# Patient Record
Sex: FEMALE | Race: WHITE | Employment: UNEMPLOYED | URBAN - NONMETROPOLITAN AREA
[De-identification: names, ages, dates, MRNs, and addresses within clinical notes are randomized per-mention and may not be internally consistent; named-entity substitution may affect disease eponyms.]

---

## 2023-08-10 ENCOUNTER — HOSPITAL ENCOUNTER (OUTPATIENT)
Dept: NUCLEAR MEDICINE | Age: 53
Discharge: HOME | End: 2023-08-10

## 2023-08-10 DIAGNOSIS — R60.0 BILATERAL LOWER EXTREMITY EDEMA: ICD-10-CM

## 2023-08-10 DIAGNOSIS — I88.9 NONSPECIFIC LYMPHADENITIS: ICD-10-CM

## 2023-12-12 ENCOUNTER — HOSPITAL ENCOUNTER (OUTPATIENT)
Age: 53
Discharge: HOME | End: 2023-12-12
Payer: COMMERCIAL

## 2023-12-12 PROCEDURE — 36415 COLL VENOUS BLD VENIPUNCTURE: CPT

## 2023-12-12 PROCEDURE — 86038 ANTINUCLEAR ANTIBODIES: CPT

## 2023-12-14 LAB — NUCLEAR IGG SER QL IA: DETECTED

## 2023-12-16 LAB
ANA PAT SER IF-IMP: ABNORMAL
NUCLEAR IGG SER QL IF: DETECTED
NUCLEAR IGG TITR SER IF: ABNORMAL {TITER}

## 2024-09-24 ENCOUNTER — HOSPITAL ENCOUNTER (OUTPATIENT)
Dept: NUCLEAR MEDICINE | Age: 54
Discharge: HOME | End: 2024-09-24
Attending: INTERNAL MEDICINE
Payer: COMMERCIAL

## 2024-09-24 DIAGNOSIS — K31.84 GASTROPARESIS: ICD-10-CM

## 2024-09-24 PROCEDURE — A9541 TC99M SULFUR COLLOID: HCPCS | Performed by: INTERNAL MEDICINE

## 2024-09-24 PROCEDURE — 78264 GASTRIC EMPTYING IMG STUDY: CPT

## 2024-09-24 PROCEDURE — 3430000000 HC RX DIAGNOSTIC RADIOPHARMACEUTICAL: Performed by: INTERNAL MEDICINE

## 2024-09-24 RX ADMIN — Medication 1.1 MILLICURIE: at 07:25

## 2024-12-16 ENCOUNTER — LAB (OUTPATIENT)
Dept: LAB | Age: 54
End: 2024-12-16

## 2024-12-16 ENCOUNTER — OFFICE VISIT (OUTPATIENT)
Age: 54
End: 2024-12-16
Payer: COMMERCIAL

## 2024-12-16 VITALS
OXYGEN SATURATION: 98 % | HEIGHT: 61 IN | SYSTOLIC BLOOD PRESSURE: 92 MMHG | BODY MASS INDEX: 36.63 KG/M2 | WEIGHT: 194 LBS | HEART RATE: 78 BPM | DIASTOLIC BLOOD PRESSURE: 68 MMHG

## 2024-12-16 DIAGNOSIS — R76.0 ANTI-CARDIOLIPIN ANTIBODY POSITIVE: ICD-10-CM

## 2024-12-16 DIAGNOSIS — M25.50 POLYARTHRALGIA: ICD-10-CM

## 2024-12-16 DIAGNOSIS — M54.42 CHRONIC MIDLINE LOW BACK PAIN WITH BILATERAL SCIATICA: ICD-10-CM

## 2024-12-16 DIAGNOSIS — R53.83 OTHER FATIGUE: ICD-10-CM

## 2024-12-16 DIAGNOSIS — R76.8 ANA POSITIVE: ICD-10-CM

## 2024-12-16 DIAGNOSIS — M54.41 CHRONIC MIDLINE LOW BACK PAIN WITH BILATERAL SCIATICA: ICD-10-CM

## 2024-12-16 DIAGNOSIS — R76.8 ANA POSITIVE: Primary | ICD-10-CM

## 2024-12-16 DIAGNOSIS — G89.29 CHRONIC MIDLINE LOW BACK PAIN WITH BILATERAL SCIATICA: ICD-10-CM

## 2024-12-16 LAB
ALBUMIN SERPL BCG-MCNC: 4.3 G/DL (ref 3.5–5.1)
ALP SERPL-CCNC: 111 U/L (ref 38–126)
ALT SERPL W/O P-5'-P-CCNC: 10 U/L (ref 11–66)
ANION GAP SERPL CALC-SCNC: 12 MEQ/L (ref 8–16)
AST SERPL-CCNC: 10 U/L (ref 5–40)
BASOPHILS ABSOLUTE: 0 THOU/MM3 (ref 0–0.1)
BASOPHILS NFR BLD AUTO: 0.2 %
BILIRUB SERPL-MCNC: 0.3 MG/DL (ref 0.3–1.2)
BILIRUB UR QL STRIP: NEGATIVE
BUN SERPL-MCNC: 14 MG/DL (ref 7–22)
CALCIUM SERPL-MCNC: 8.9 MG/DL (ref 8.5–10.5)
CHARACTER UR: CLEAR
CHLORIDE SERPL-SCNC: 105 MEQ/L (ref 98–111)
CO2 SERPL-SCNC: 23 MEQ/L (ref 23–33)
COLOR UR: ABNORMAL
CREAT SERPL-MCNC: 0.7 MG/DL (ref 0.4–1.2)
CREAT UR-MCNC: 226.7 MG/DL
CRP SERPL-MCNC: < 0.3 MG/DL (ref 0–1)
DEPRECATED RDW RBC AUTO: 42.9 FL (ref 35–45)
EOSINOPHIL NFR BLD AUTO: 0.5 %
EOSINOPHILS ABSOLUTE: 0 THOU/MM3 (ref 0–0.4)
ERYTHROCYTE [DISTWIDTH] IN BLOOD BY AUTOMATED COUNT: 13 % (ref 11.5–14.5)
ERYTHROCYTE [SEDIMENTATION RATE] IN BLOOD BY WESTERGREN METHOD: 7 MM/HR (ref 0–20)
GFR SERPL CREATININE-BSD FRML MDRD: > 90 ML/MIN/1.73M2
GLUCOSE SERPL-MCNC: 90 MG/DL (ref 70–108)
GLUCOSE UR QL STRIP.AUTO: NEGATIVE MG/DL
HCT VFR BLD AUTO: 42.9 % (ref 37–47)
HGB BLD-MCNC: 14 GM/DL (ref 12–16)
HGB UR QL STRIP.AUTO: NEGATIVE
IMM GRANULOCYTES # BLD AUTO: 0.03 THOU/MM3 (ref 0–0.07)
IMM GRANULOCYTES NFR BLD AUTO: 0.4 %
KETONES UR QL STRIP.AUTO: ABNORMAL
LEUKOCYTE ESTERASE UR QL STRIP.AUTO: NEGATIVE
LYMPHOCYTES ABSOLUTE: 2.1 THOU/MM3 (ref 1–4.8)
LYMPHOCYTES NFR BLD AUTO: 26.6 %
MCH RBC QN AUTO: 29.3 PG (ref 26–33)
MCHC RBC AUTO-ENTMCNC: 32.6 GM/DL (ref 32.2–35.5)
MCV RBC AUTO: 89.7 FL (ref 81–99)
MONOCYTES ABSOLUTE: 0.5 THOU/MM3 (ref 0.4–1.3)
MONOCYTES NFR BLD AUTO: 6 %
NEUTROPHILS ABSOLUTE: 5.3 THOU/MM3 (ref 1.8–7.7)
NEUTROPHILS NFR BLD AUTO: 66.3 %
NITRITE UR QL STRIP.AUTO: NEGATIVE
NRBC BLD AUTO-RTO: 0 /100 WBC
PH UR STRIP.AUTO: 5.5 [PH] (ref 5–9)
PLATELET # BLD AUTO: 223 THOU/MM3 (ref 130–400)
PMV BLD AUTO: 11.1 FL (ref 9.4–12.4)
POTASSIUM SERPL-SCNC: 3.6 MEQ/L (ref 3.5–5.2)
PROT SERPL-MCNC: 6.9 G/DL (ref 6.1–8)
PROT UR STRIP.AUTO-MCNC: NEGATIVE MG/DL
PROT UR-MCNC: 25.5 MG/DL
PROT/CREAT 24H UR: 0.11 MG/G{CREAT}
RBC # BLD AUTO: 4.78 MILL/MM3 (ref 4.2–5.4)
SODIUM SERPL-SCNC: 140 MEQ/L (ref 135–145)
SP GR UR REFRACT.AUTO: > 1.03 (ref 1–1.03)
UROBILINOGEN UR QL STRIP.AUTO: 0.2 EU/DL (ref 0–1)
WBC # BLD AUTO: 8 THOU/MM3 (ref 4.8–10.8)

## 2024-12-16 PROCEDURE — 3017F COLORECTAL CA SCREEN DOC REV: CPT | Performed by: INTERNAL MEDICINE

## 2024-12-16 PROCEDURE — 4004F PT TOBACCO SCREEN RCVD TLK: CPT | Performed by: INTERNAL MEDICINE

## 2024-12-16 PROCEDURE — G8417 CALC BMI ABV UP PARAM F/U: HCPCS | Performed by: INTERNAL MEDICINE

## 2024-12-16 PROCEDURE — G8427 DOCREV CUR MEDS BY ELIG CLIN: HCPCS | Performed by: INTERNAL MEDICINE

## 2024-12-16 PROCEDURE — 99204 OFFICE O/P NEW MOD 45 MIN: CPT | Performed by: INTERNAL MEDICINE

## 2024-12-16 PROCEDURE — G8484 FLU IMMUNIZE NO ADMIN: HCPCS | Performed by: INTERNAL MEDICINE

## 2024-12-16 RX ORDER — IBUPROFEN 800 MG/1
800 TABLET, FILM COATED ORAL EVERY 8 HOURS PRN
COMMUNITY
Start: 2024-10-17

## 2024-12-16 RX ORDER — ATORVASTATIN CALCIUM 20 MG/1
20 TABLET, FILM COATED ORAL DAILY
COMMUNITY
Start: 2024-04-06

## 2024-12-16 RX ORDER — METFORMIN HYDROCHLORIDE 500 MG/1
500 TABLET, EXTENDED RELEASE ORAL 2 TIMES DAILY
COMMUNITY
Start: 2024-02-05

## 2024-12-16 RX ORDER — POTASSIUM CHLORIDE 750 MG/1
10 TABLET, EXTENDED RELEASE ORAL DAILY
COMMUNITY
Start: 2024-11-04 | End: 2025-05-03

## 2024-12-16 RX ORDER — ALBUTEROL SULFATE 90 UG/1
2 INHALANT RESPIRATORY (INHALATION) EVERY 6 HOURS PRN
COMMUNITY
Start: 2024-01-10

## 2024-12-16 RX ORDER — LEVOTHYROXINE SODIUM 125 UG/1
125 TABLET ORAL
COMMUNITY
Start: 2024-11-05

## 2024-12-16 RX ORDER — BUMETANIDE 1 MG/1
TABLET ORAL
COMMUNITY
Start: 2024-05-05

## 2024-12-16 RX ORDER — CYCLOBENZAPRINE HCL 10 MG
10 TABLET ORAL 3 TIMES DAILY PRN
COMMUNITY
Start: 2024-04-24

## 2024-12-16 RX ORDER — TRAZODONE HYDROCHLORIDE 100 MG/1
200 TABLET ORAL NIGHTLY
COMMUNITY
Start: 2024-03-17

## 2024-12-16 RX ORDER — SEMAGLUTIDE 0.68 MG/ML
0.5 INJECTION, SOLUTION SUBCUTANEOUS WEEKLY
COMMUNITY
Start: 2024-04-10

## 2024-12-16 RX ORDER — NYSTATIN 100000 U/G
CREAM TOPICAL
COMMUNITY
Start: 2024-11-04

## 2024-12-16 RX ORDER — HYDROXYZINE HYDROCHLORIDE 25 MG/1
25 TABLET, FILM COATED ORAL 3 TIMES DAILY PRN
COMMUNITY
Start: 2024-04-07

## 2024-12-16 RX ORDER — ONDANSETRON 4 MG/1
4 TABLET, ORALLY DISINTEGRATING ORAL EVERY 8 HOURS PRN
COMMUNITY
Start: 2024-11-04

## 2024-12-16 RX ORDER — FLUOXETINE 40 MG/1
CAPSULE ORAL
COMMUNITY
Start: 2024-04-04

## 2024-12-16 RX ORDER — LINACLOTIDE 290 UG/1
CAPSULE, GELATIN COATED ORAL
COMMUNITY
Start: 2024-10-11

## 2024-12-16 ASSESSMENT — ENCOUNTER SYMPTOMS
EYES NEGATIVE: 1
GASTROINTESTINAL NEGATIVE: 1
RESPIRATORY NEGATIVE: 1

## 2024-12-16 NOTE — PROGRESS NOTES
12/16/2024       The risks and benefits of my recommendations, as well as other treatment options, benefits and side effects were discussed with the patient today.Questions were answered.    Thank you for allowing me to participate in the care of this patient. Please call if there are any questions.

## 2024-12-17 LAB
C3C SERPL-MCNC: 146 MG/DL (ref 90–180)
C4 SERPL-MCNC: 27 MG/DL (ref 10–40)

## 2024-12-18 ENCOUNTER — TELEPHONE (OUTPATIENT)
Age: 54
End: 2024-12-18

## 2024-12-18 NOTE — TELEPHONE ENCOUNTER
Attempted to contact the pt, no answer; left message for the pt to contact the office to discuss.

## 2024-12-18 NOTE — TELEPHONE ENCOUNTER
----- Message from Dr. Liz Salgado, DO sent at 12/18/2024 12:17 PM EST -----  The labs have revealed no significant abnormalities in the blood counts, liver kidney function test.  The urine studies revealed no significant elevation of protein in the urine or evidence of infection.    There are lab(s) that have not returned. Once these have returned you will be notified of any abnormalities

## 2024-12-23 LAB
CONFIRM DRVVT STA-STACLOT: 5 S
DRVVT SCREEN TO CONFIRM RATIO: ABNORMAL {RATIO}
DRVVT/DRVVT CFM P DOAC NEUT NORM PPP-RTO: ABNORMAL {RATIO}
HEPARIN NT PPP QL: ABNORMAL
LA 3 SCREEN W REFLEX-IMP: ABNORMAL
LMW HEPARIN IND PLT AB SER QL: ABNORMAL
MIXING DRVVT/NORMAL: ABNORMAL %
PROTHROMBIN TIME: 12.5 S (ref 12–15.5)
SCREEN APTT/NORMAL: 1.26
SCREEN APTT/NORMAL: ABNORMAL
SCREEN DRVVT/NORMAL: 1.01 %
THROMBIN TIME: 16 S

## 2024-12-24 ENCOUNTER — TELEPHONE (OUTPATIENT)
Age: 54
End: 2024-12-24

## 2024-12-24 DIAGNOSIS — M79.7 FIBROMYALGIA: Primary | ICD-10-CM

## 2024-12-24 NOTE — TELEPHONE ENCOUNTER
----- Message from Dr. Liz Salgado DO sent at 12/23/2024  8:46 AM EST -----  The send out labs showed a positive antinuclear antibody (MARJ) and low beta 2 glycoprotein level. The blood testing help specifically look for systemic lupus and other similar inflammatory conditions.

## 2024-12-26 ENCOUNTER — HOSPITAL ENCOUNTER (OUTPATIENT)
Dept: GENERAL RADIOLOGY | Age: 54
Discharge: HOME | End: 2024-12-26

## 2024-12-26 ENCOUNTER — HOSPITAL ENCOUNTER (OUTPATIENT)
Dept: CT IMAGING | Age: 54
Discharge: HOME | End: 2024-12-26
Attending: RADIOLOGY

## 2024-12-26 ENCOUNTER — TELEPHONE (OUTPATIENT)
Age: 54
End: 2024-12-26

## 2024-12-26 DIAGNOSIS — Z00.6 EXAMINATION FOR NORMAL COMPARISON OR CONTROL IN CLINICAL RESEARCH: ICD-10-CM

## 2024-12-26 DIAGNOSIS — M25.50 POLYARTHRALGIA: ICD-10-CM

## 2024-12-26 DIAGNOSIS — M79.7 FIBROMYALGIA: Primary | ICD-10-CM

## 2024-12-26 RX ORDER — GABAPENTIN 300 MG/1
300 CAPSULE ORAL NIGHTLY
Qty: 90 CAPSULE | Refills: 1 | Status: SHIPPED | OUTPATIENT
Start: 2024-12-26 | End: 2024-12-26 | Stop reason: SDUPTHER

## 2024-12-26 RX ORDER — GABAPENTIN 300 MG/1
CAPSULE ORAL
Qty: 67 CAPSULE | Refills: 0 | Status: SHIPPED | OUTPATIENT
Start: 2024-12-26 | End: 2025-02-01

## 2024-12-26 NOTE — TELEPHONE ENCOUNTER
Diagnosis Orders   1. Fibromyalgia  gabapentin (NEURONTIN) 300 MG capsule    DISCONTINUED: gabapentin (NEURONTIN) 300 MG capsule

## 2024-12-27 RX ORDER — PREDNISONE 10 MG/1
TABLET ORAL
Qty: 15 TABLET | Refills: 0 | Status: SHIPPED | OUTPATIENT
Start: 2024-12-27 | End: 2025-01-05

## 2024-12-27 NOTE — TELEPHONE ENCOUNTER
Back imaging reviewed. There are no overt SI joint changes. Lower thoracic spine with bilateral thing bridging osteophytes.

## 2025-03-05 ENCOUNTER — OFFICE VISIT (OUTPATIENT)
Age: 55
End: 2025-03-05
Payer: MEDICAID

## 2025-03-05 VITALS
HEIGHT: 61 IN | OXYGEN SATURATION: 96 % | DIASTOLIC BLOOD PRESSURE: 78 MMHG | HEART RATE: 83 BPM | SYSTOLIC BLOOD PRESSURE: 122 MMHG | BODY MASS INDEX: 37.78 KG/M2 | WEIGHT: 200.1 LBS

## 2025-03-05 DIAGNOSIS — R76.0 ANTI-CARDIOLIPIN ANTIBODY POSITIVE: ICD-10-CM

## 2025-03-05 DIAGNOSIS — M54.42 CHRONIC MIDLINE LOW BACK PAIN WITH BILATERAL SCIATICA: ICD-10-CM

## 2025-03-05 DIAGNOSIS — Z51.81 MEDICATION MONITORING ENCOUNTER: ICD-10-CM

## 2025-03-05 DIAGNOSIS — G89.29 CHRONIC MIDLINE LOW BACK PAIN WITH BILATERAL SCIATICA: ICD-10-CM

## 2025-03-05 DIAGNOSIS — M54.41 CHRONIC MIDLINE LOW BACK PAIN WITH BILATERAL SCIATICA: ICD-10-CM

## 2025-03-05 DIAGNOSIS — R76.8 ANA POSITIVE: ICD-10-CM

## 2025-03-05 DIAGNOSIS — M79.7 FIBROMYALGIA: ICD-10-CM

## 2025-03-05 DIAGNOSIS — M25.50 POLYARTHRALGIA: Primary | ICD-10-CM

## 2025-03-05 PROCEDURE — 99214 OFFICE O/P EST MOD 30 MIN: CPT | Performed by: NURSE PRACTITIONER

## 2025-03-05 PROCEDURE — 99213 OFFICE O/P EST LOW 20 MIN: CPT | Performed by: NURSE PRACTITIONER

## 2025-03-05 RX ORDER — SULFASALAZINE 500 MG/1
TABLET ORAL
Qty: 120 TABLET | Refills: 0 | Status: SHIPPED | OUTPATIENT
Start: 2025-03-05

## 2025-03-05 ASSESSMENT — ENCOUNTER SYMPTOMS
EYES NEGATIVE: 1
RESPIRATORY NEGATIVE: 1
GASTROINTESTINAL NEGATIVE: 1

## 2025-03-05 NOTE — PROGRESS NOTES
6 HOURS PRN    atorvastatin (LIPITOR) 20 mg, Oral, DAILY    bumetanide (BUMEX) 1 MG tablet 2 in the AM and 1 at noon    cyclobenzaprine (FLEXERIL) 10 mg, Oral, 3 TIMES DAILY PRN    esomeprazole (NEXIUM) 20 mg, Oral, DAILY BEFORE BREAKFAST    FLUoxetine (PROZAC) 40 MG capsule     gabapentin (NEURONTIN) 300 MG capsule Take 1 capsule by mouth nightly for 7 days, THEN 1 capsule in the morning and at bedtime for 30 days. Intended supply: 90 days.    hydrOXYzine HCl (ATARAX) 25 mg, Oral, 3 TIMES DAILY PRN    ibuprofen (ADVIL;MOTRIN) 800 mg, Oral, EVERY 8 HOURS PRN    levothyroxine (SYNTHROID) 125 mcg, Oral, DAILY BEFORE BREAKFAST    LINZESS 290 MCG CAPS capsule     metFORMIN (GLUCOPHAGE-XR) 500 mg, Oral, 2 TIMES DAILY    nystatin (MYCOSTATIN) 609441 UNIT/GM cream Apply 2 times a day to affected area(s) until area resolved.    ondansetron (ZOFRAN-ODT) 4 mg, Oral, EVERY 8 HOURS PRN    Ozempic (0.25 or 0.5 MG/DOSE) 0.5 mg, SubCUTAneous, WEEKLY    potassium chloride (KLOR-CON M) 10 MEQ extended release tablet 10 mEq, Oral, DAILY    traZODone (DESYREL) 200 mg, Oral, NIGHTLY       Allergies:  Morphine    Objective   /78 (Site: Left Upper Arm, Position: Sitting, Cuff Size: Large Adult)   Pulse 83   Ht 1.549 m (5' 0.98\")   Wt 90.8 kg (200 lb 1.6 oz)   SpO2 96%   BMI 37.83 kg/m²     Physical Exam  Vitals reviewed.   Constitutional:       Appearance: She is well-developed.   Cardiovascular:      Rate and Rhythm: Normal rate and regular rhythm.   Pulmonary:      Effort: Pulmonary effort is normal.      Breath sounds: Normal breath sounds.   Musculoskeletal:      Cervical back: Normal range of motion and neck supple.   Skin:     General: Skin is warm and dry.      Findings: No rash.   Neurological:      Mental Status: She is alert and oriented to person, place, and time.   Psychiatric:         Thought Content: Thought content normal.       Musculoskeletal:    Upper extremities:  SHOULDERS tender right   ELBOWS tender right

## 2025-03-11 ENCOUNTER — TELEPHONE (OUTPATIENT)
Dept: ADMINISTRATIVE | Age: 55
End: 2025-03-11

## 2025-03-11 NOTE — TELEPHONE ENCOUNTER
Where is pain located?  Left wrist, left hand, lower back   When did the pain start?  She states that the pain worsened over the weekend.  Rate the pain 1-10. Ten being the worst  9  Describe the pain.  (Dull, Aching, Stabbing, radiating, etc)  Achy, tender to touch  Has this pain occurred in the past?  Yes   What have you used to alleviate this pain?  Tylenol arthritis, biofreeze   What aggravates it?  Any movement   Joint swelling or redness?  Swelling.

## 2025-03-11 NOTE — TELEPHONE ENCOUNTER
Can you please clarify where the joint swelling is located at? Also, would like for her to have bloodwork completed to see if inflammatory markers are increased with the flare.

## 2025-03-11 NOTE — TELEPHONE ENCOUNTER
Patient requesting prednisone she states she is in a lot of pain.     Please send to   [facility] [address] [place] [address], [place] - [address] [facility] -  [phone] - F [phone]

## 2025-03-12 NOTE — TELEPHONE ENCOUNTER
Contacted patient.  She will go today to get the labs completed.  She states that the swelling is the on the top of her hand and extends back to the base of the thumb near the wrist.

## 2025-03-15 LAB
ALBUMIN: 4.2 G/DL
ALP BLD-CCNC: 86 U/L
ALT SERPL-CCNC: 21 U/L
ANION GAP SERPL CALCULATED.3IONS-SCNC: 13 MMOL/L
AST SERPL-CCNC: 27 U/L
BASOPHILS ABSOLUTE: 0 /ΜL
BASOPHILS RELATIVE PERCENT: 0.4 %
BILIRUB SERPL-MCNC: 0.5 MG/DL (ref 0.1–1.4)
BUN BLDV-MCNC: 15 MG/DL
C-REACTIVE PROTEIN: 0.5
CALCIUM SERPL-MCNC: 9 MG/DL
CHLORIDE BLD-SCNC: 107 MMOL/L
CO2: 24 MMOL/L
CREAT SERPL-MCNC: 0.8 MG/DL
EOSINOPHILS ABSOLUTE: 0.4 /ΜL
EOSINOPHILS RELATIVE PERCENT: 0.6 %
GFR, ESTIMATED: 60
GLUCOSE BLD-MCNC: 103 MG/DL
HCT VFR BLD CALC: 43.5 % (ref 36–46)
HEMOGLOBIN: 14.1 G/DL (ref 12–16)
LYMPHOCYTES ABSOLUTE: 2.8 /ΜL
LYMPHOCYTES RELATIVE PERCENT: 34.1 %
MCH RBC QN AUTO: 29.3 PG
MCHC RBC AUTO-ENTMCNC: 32.4 G/DL
MCV RBC AUTO: 90.4 FL
MONOCYTES ABSOLUTE: 0.5 /ΜL
MONOCYTES RELATIVE PERCENT: 6.4 %
NEUTROPHILS ABSOLUTE: 4.7 /ΜL
NEUTROPHILS RELATIVE PERCENT: 58.1 %
PDW BLD-RTO: 13.2 %
PLATELET # BLD: 217 K/ΜL
PMV BLD AUTO: 10.1 FL
POTASSIUM SERPL-SCNC: 3.9 MMOL/L
RBC # BLD: 4.81 10^6/ΜL
SED RATE, AUTOMATED: 5
SODIUM BLD-SCNC: 140 MMOL/L
TOTAL PROTEIN: 7.1 G/DL (ref 6.4–8.2)
WBC # BLD: 8.2 10^3/ML

## 2025-03-17 ENCOUNTER — RESULTS FOLLOW-UP (OUTPATIENT)
Age: 55
End: 2025-03-17

## 2025-03-17 RX ORDER — SULFASALAZINE 500 MG/1
1000 TABLET ORAL 2 TIMES DAILY
Qty: 120 TABLET | Refills: 0 | Status: SHIPPED | OUTPATIENT
Start: 2025-03-17

## 2025-05-06 ENCOUNTER — OFFICE VISIT (OUTPATIENT)
Age: 55
End: 2025-05-06
Payer: COMMERCIAL

## 2025-05-06 VITALS
BODY MASS INDEX: 37.69 KG/M2 | HEART RATE: 71 BPM | OXYGEN SATURATION: 97 % | DIASTOLIC BLOOD PRESSURE: 68 MMHG | SYSTOLIC BLOOD PRESSURE: 126 MMHG | HEIGHT: 61 IN | WEIGHT: 199.6 LBS

## 2025-05-06 DIAGNOSIS — R53.83 OTHER FATIGUE: ICD-10-CM

## 2025-05-06 DIAGNOSIS — G89.29 CHRONIC MIDLINE LOW BACK PAIN WITH BILATERAL SCIATICA: ICD-10-CM

## 2025-05-06 DIAGNOSIS — M79.7 FIBROMYALGIA: ICD-10-CM

## 2025-05-06 DIAGNOSIS — Z51.81 MEDICATION MONITORING ENCOUNTER: ICD-10-CM

## 2025-05-06 DIAGNOSIS — R21 RASH: ICD-10-CM

## 2025-05-06 DIAGNOSIS — R76.0 ANTI-CARDIOLIPIN ANTIBODY POSITIVE: ICD-10-CM

## 2025-05-06 DIAGNOSIS — M54.42 CHRONIC MIDLINE LOW BACK PAIN WITH BILATERAL SCIATICA: ICD-10-CM

## 2025-05-06 DIAGNOSIS — M54.41 CHRONIC MIDLINE LOW BACK PAIN WITH BILATERAL SCIATICA: ICD-10-CM

## 2025-05-06 DIAGNOSIS — M25.50 POLYARTHRALGIA: Primary | ICD-10-CM

## 2025-05-06 DIAGNOSIS — R76.8 ANA POSITIVE: ICD-10-CM

## 2025-05-06 PROCEDURE — 3017F COLORECTAL CA SCREEN DOC REV: CPT | Performed by: NURSE PRACTITIONER

## 2025-05-06 PROCEDURE — 4004F PT TOBACCO SCREEN RCVD TLK: CPT | Performed by: NURSE PRACTITIONER

## 2025-05-06 PROCEDURE — 99214 OFFICE O/P EST MOD 30 MIN: CPT | Performed by: NURSE PRACTITIONER

## 2025-05-06 PROCEDURE — G8417 CALC BMI ABV UP PARAM F/U: HCPCS | Performed by: NURSE PRACTITIONER

## 2025-05-06 PROCEDURE — G8427 DOCREV CUR MEDS BY ELIG CLIN: HCPCS | Performed by: NURSE PRACTITIONER

## 2025-05-06 RX ORDER — TRAMADOL HYDROCHLORIDE 50 MG/1
TABLET ORAL
COMMUNITY
Start: 2025-02-19

## 2025-05-06 ASSESSMENT — ENCOUNTER SYMPTOMS
GASTROINTESTINAL NEGATIVE: 1
RESPIRATORY NEGATIVE: 1
EYES NEGATIVE: 1

## 2025-05-06 NOTE — PROGRESS NOTES
[facility] RHEUMATOLOGY FOLLOW UP NOTE     Date Of Service: 5/6/2025  Provider: Mary Monson, APRN - CNP   PCP: Bimal Gee NP-C   Name: Luis M Gibson   MRN: [id number]    Subjective   CHIEF COMPLAINT:    Chief Complaint   Patient presents with    Follow-up     2-month f/u for polyarthralgia.        Luis M Gibson  is a(n)54 y.o. female  here for the f/u evaluation of + MARJ, polyarthralgia      Interval hx:    - continued joint pain-  ? Improvement on the sulfasalazine    - rash below breast and in groin- PCP gave her antifungal medication which is not helping     pain affecting the left wrist, low back   Pain on a scale 0-10: 5/10  Type of pain: intermittent  Timing: mornings and evenings  Aggravating factors:  back: prolonged sitting, walking. Hands: typing, writing   Alleviating factors: ibuprofen      Associated symptoms:  + swelling/  Redness/ warmth (left thumb), + AM stiffness lasting ~ 45 mins      REVIEW OF SYSTEMS: (ROS)    Review of Systems   Constitutional: Negative.    HENT: Negative.     Eyes: Negative.    Respiratory: Negative.     Cardiovascular: Negative.    Gastrointestinal: Negative.    Endocrine: Negative.    Genitourinary: Negative.    Musculoskeletal:  Positive for arthralgias and joint swelling.   Skin: Negative.    Neurological:  Positive for numbness.   Psychiatric/Behavioral: Negative.         Past Medical History:  has a past medical history of Autoimmune disease, Depression, Diabetes ([facility]), Fibromyalgia, Lupus (systemic lupus erythematosus) ([facility]), and Rheumatoid arthritis ([facility]).    Current Medications:    Current Outpatient Medications   Medication Instructions    albuterol sulfate HFA (PROVENTIL;VENTOLIN;PROAIR) 108 (90 Base) MCG/ACT inhaler 2 puffs, EVERY 6 HOURS PRN    atorvastatin (LIPITOR) 20 mg, DAILY    bumetanide (BUMEX) 1 MG tablet 2 in the AM and 1 at noon    cyclobenzaprine (FLEXERIL) 10 mg, 3 TIMES DAILY PRN    esomeprazole (NEXIUM) 20 mg, DAILY BEFORE

## 2025-05-21 ENCOUNTER — RESULTS FOLLOW-UP (OUTPATIENT)
Age: 55
End: 2025-05-21

## 2025-05-21 LAB
ALBUMIN: 3.9 G/DL
ALP BLD-CCNC: 87 U/L
ALT SERPL-CCNC: 16 U/L
ANION GAP SERPL CALCULATED.3IONS-SCNC: 12 MMOL/L
AST SERPL-CCNC: 20 U/L
BASOPHILS ABSOLUTE: 0 /ΜL
BASOPHILS RELATIVE PERCENT: 0.3 %
BILIRUB SERPL-MCNC: 0.5 MG/DL (ref 0.1–1.4)
BUN BLDV-MCNC: 18 MG/DL
C-REACTIVE PROTEIN: 0.5
CALCIUM SERPL-MCNC: 9.1 MG/DL
CHLORIDE BLD-SCNC: 106 MMOL/L
CO2: 25 MMOL/L
CREAT SERPL-MCNC: 1 MG/DL
EOSINOPHILS ABSOLUTE: 0.2 /ΜL
EOSINOPHILS RELATIVE PERCENT: 1.9 %
GFR, ESTIMATED: 60
GLUCOSE BLD-MCNC: 93 MG/DL
HCT VFR BLD CALC: 42 % (ref 36–46)
HEMOGLOBIN: 13.8 G/DL (ref 12–16)
LYMPHOCYTES ABSOLUTE: 3.1 /ΜL
LYMPHOCYTES RELATIVE PERCENT: 30.9 %
MCH RBC QN AUTO: 29.9 PG
MCHC RBC AUTO-ENTMCNC: 32.9 G/DL
MCV RBC AUTO: 90.9 FL
MONOCYTES ABSOLUTE: 0.8 /ΜL
MONOCYTES RELATIVE PERCENT: 7.9 %
NEUTROPHILS ABSOLUTE: 5.8 /ΜL
NEUTROPHILS RELATIVE PERCENT: 58.8 %
PDW BLD-RTO: 13.5 %
PLATELET # BLD: 247 K/ΜL
PMV BLD AUTO: 10.5 FL
POTASSIUM SERPL-SCNC: 3.6 MMOL/L
RBC # BLD: 4.62 10^6/ΜL
SED RATE, AUTOMATED: 5
SODIUM BLD-SCNC: 139 MMOL/L
TOTAL PROTEIN: 6.6 G/DL (ref 6.4–8.2)
WBC # BLD: 9.9 10^3/ML

## 2025-05-21 RX ORDER — SULFASALAZINE 500 MG/1
1000 TABLET ORAL 2 TIMES DAILY
Qty: 120 TABLET | Refills: 0 | Status: SHIPPED | OUTPATIENT
Start: 2025-05-21 | End: 2025-06-13 | Stop reason: SDUPTHER

## 2025-05-27 ENCOUNTER — RESULTS FOLLOW-UP (OUTPATIENT)
Age: 55
End: 2025-05-27

## 2025-06-12 LAB
ALBUMIN: 4.2 G/DL
ALP BLD-CCNC: 100 U/L
ALT SERPL-CCNC: 23 U/L
ANION GAP SERPL CALCULATED.3IONS-SCNC: 10 MMOL/L
AST SERPL-CCNC: 25 U/L
BASOPHILS ABSOLUTE: 0.1 /ΜL
BASOPHILS RELATIVE PERCENT: 0.4 %
BILIRUB SERPL-MCNC: 0.3 MG/DL (ref 0.1–1.4)
BUN BLDV-MCNC: 10 MG/DL
C-REACTIVE PROTEIN: 1.8
CALCIUM SERPL-MCNC: 9.1 MG/DL
CHLORIDE BLD-SCNC: 107 MMOL/L
CO2: 26 MMOL/L
CREAT SERPL-MCNC: 0.9 MG/DL
EOSINOPHILS ABSOLUTE: 0.5 /ΜL
EOSINOPHILS RELATIVE PERCENT: 3.9 %
GFR, ESTIMATED: 60
GLUCOSE BLD-MCNC: 111 MG/DL
HCT VFR BLD CALC: 42.7 % (ref 36–46)
HEMOGLOBIN: 14.1 G/DL (ref 12–16)
LYMPHOCYTES ABSOLUTE: 3 /ΜL
LYMPHOCYTES RELATIVE PERCENT: 25.8 %
MCH RBC QN AUTO: 29.4 PG
MCHC RBC AUTO-ENTMCNC: 33 G/DL
MCV RBC AUTO: 89 FL
MONOCYTES ABSOLUTE: 0.6 /ΜL
MONOCYTES RELATIVE PERCENT: 5.5 %
NEUTROPHILS ABSOLUTE: 7.3 /ΜL
NEUTROPHILS RELATIVE PERCENT: 63.9 %
PDW BLD-RTO: 13.3 %
PLATELET # BLD: 262 K/ΜL
PMV BLD AUTO: 9.9 FL
POTASSIUM SERPL-SCNC: 4.1 MMOL/L
RBC # BLD: 4.8 10^6/ΜL
SED RATE, AUTOMATED: 23
SODIUM BLD-SCNC: 139 MMOL/L
TOTAL PROTEIN: 7.1 G/DL (ref 6.4–8.2)
WBC # BLD: 11.5 10^3/ML

## 2025-06-13 ENCOUNTER — RESULTS FOLLOW-UP (OUTPATIENT)
Age: 55
End: 2025-06-13

## 2025-06-13 RX ORDER — SULFASALAZINE 500 MG/1
1000 TABLET ORAL 2 TIMES DAILY
Qty: 120 TABLET | Refills: 0 | Status: SHIPPED | OUTPATIENT
Start: 2025-06-13

## 2025-07-21 ENCOUNTER — OFFICE VISIT (OUTPATIENT)
Age: 55
End: 2025-07-21
Payer: COMMERCIAL

## 2025-07-21 VITALS
WEIGHT: 200.2 LBS | BODY MASS INDEX: 37.8 KG/M2 | HEART RATE: 96 BPM | HEIGHT: 61 IN | OXYGEN SATURATION: 97 % | DIASTOLIC BLOOD PRESSURE: 70 MMHG | SYSTOLIC BLOOD PRESSURE: 128 MMHG

## 2025-07-21 DIAGNOSIS — R21 RASH: Primary | ICD-10-CM

## 2025-07-21 DIAGNOSIS — M54.42 CHRONIC MIDLINE LOW BACK PAIN WITH BILATERAL SCIATICA: ICD-10-CM

## 2025-07-21 DIAGNOSIS — M79.7 FIBROMYALGIA: ICD-10-CM

## 2025-07-21 DIAGNOSIS — G89.29 CHRONIC MIDLINE LOW BACK PAIN WITH BILATERAL SCIATICA: ICD-10-CM

## 2025-07-21 DIAGNOSIS — M25.50 POLYARTHRALGIA: ICD-10-CM

## 2025-07-21 DIAGNOSIS — M19.90 INFLAMMATORY ARTHRITIS: ICD-10-CM

## 2025-07-21 DIAGNOSIS — M54.41 CHRONIC MIDLINE LOW BACK PAIN WITH BILATERAL SCIATICA: ICD-10-CM

## 2025-07-21 PROCEDURE — 4004F PT TOBACCO SCREEN RCVD TLK: CPT | Performed by: INTERNAL MEDICINE

## 2025-07-21 PROCEDURE — 3017F COLORECTAL CA SCREEN DOC REV: CPT | Performed by: INTERNAL MEDICINE

## 2025-07-21 PROCEDURE — G8417 CALC BMI ABV UP PARAM F/U: HCPCS | Performed by: INTERNAL MEDICINE

## 2025-07-21 PROCEDURE — 99214 OFFICE O/P EST MOD 30 MIN: CPT | Performed by: INTERNAL MEDICINE

## 2025-07-21 PROCEDURE — G8427 DOCREV CUR MEDS BY ELIG CLIN: HCPCS | Performed by: INTERNAL MEDICINE

## 2025-07-21 RX ORDER — LORATADINE 10 MG/1
10 TABLET ORAL DAILY
COMMUNITY
Start: 2025-06-20

## 2025-07-21 RX ORDER — FOLIC ACID 1 MG/1
1 TABLET ORAL DAILY
Qty: 90 TABLET | Refills: 1 | Status: SHIPPED | OUTPATIENT
Start: 2025-07-21

## 2025-07-21 RX ORDER — ONDANSETRON 4 MG/1
TABLET, FILM COATED ORAL
COMMUNITY
Start: 2025-07-15

## 2025-07-21 RX ORDER — METHOTREXATE 2.5 MG/1
15 TABLET ORAL WEEKLY
Qty: 24 TABLET | Refills: 0 | Status: SHIPPED | OUTPATIENT
Start: 2025-07-21

## 2025-07-21 RX ORDER — NYSTATIN 100000 [USP'U]/ML
SUSPENSION ORAL
COMMUNITY
Start: 2025-07-10

## 2025-07-21 RX ORDER — KETOCONAZOLE 20 MG/G
CREAM TOPICAL
Qty: 30 G | Refills: 1 | Status: SHIPPED | OUTPATIENT
Start: 2025-07-21

## 2025-07-21 ASSESSMENT — ENCOUNTER SYMPTOMS
GASTROINTESTINAL NEGATIVE: 1
RESPIRATORY NEGATIVE: 1
EYES NEGATIVE: 1

## 2025-07-21 NOTE — PROGRESS NOTES
[facility] RHEUMATOLOGY FOLLOW UP NOTE     Date Of Service: 7/21/2025  Provider: SHELLEY MANUEL DO   PCP: Bimal Gee NP-C   Name: Luis M Gibson   MRN: [id number]    Subjective   CHIEF COMPLAINT:    Chief Complaint   Patient presents with    Follow-up     Follow-up for polyarthralgia.        Luis M Gibson  is a(n)54 y.o. female  here for the f/u evaluation of + MARJ, polyarthralgia      Interval hx:     -- taking Sulfasalazine no reported related.   -- worsening left hand pain 1.5 months after the last evaluation   -- 8/10 lower back, left mcps, pips, Right wrist and left shoulder. Constant Stabbing and burning with pain radiating into the left leg   -- constant pain in the left thumb/wrists. , left shoulder pain mainly in the eveningl   -- Aggravating factors:  back: prolonged sitting, walking. Hands: typing, writing , grasping, lifting. Shoulder- liftingi , certain movement.   - Alleviating factors: ibuprofen , volteran gel.     -- redness along the left index into the thumb/cmc region.   -- swelling along the left mcps   -- am stiffness lasting 45 to 60 minutes -- sometimes longer - can be longer. - involving wrist and back     REVIEW OF SYSTEMS: (ROS)    Review of Systems   Constitutional: Negative.    HENT: Negative.     Eyes: Negative.    Respiratory: Negative.     Cardiovascular: Negative.    Gastrointestinal: Negative.    Endocrine: Negative.    Genitourinary: Negative.    Musculoskeletal:  Positive for arthralgias and joint swelling.   Skin: Negative.    Neurological:  Positive for numbness.   Psychiatric/Behavioral: Negative.         Past Medical History:  has a past medical history of Autoimmune disease, Depression, Diabetes ([facility]), Fibromyalgia, Lupus (systemic lupus erythematosus) ([facility]), and Rheumatoid arthritis ([facility]).    Current Medications:    Current Outpatient Medications   Medication Instructions    albuterol sulfate HFA (PROVENTIL;VENTOLIN;PROAIR) 108 (90 Base) MCG/ACT inhaler 2 puffs,

## 2025-07-23 ENCOUNTER — TELEPHONE (OUTPATIENT)
Age: 55
End: 2025-07-23

## 2025-07-28 ENCOUNTER — TELEPHONE (OUTPATIENT)
Age: 55
End: 2025-07-28

## 2025-07-28 NOTE — TELEPHONE ENCOUNTER
PA started and submitted through [employer] via fax.  To be complete at [facility]

## 2025-07-29 ENCOUNTER — TELEPHONE (OUTPATIENT)
Age: 55
End: 2025-07-29

## 2025-07-29 NOTE — TELEPHONE ENCOUNTER
LM regarding patients insurance.  Just need to verify her insurances.  Trying to do a PA for her MRI that she would like completed at [facility] and [employer] is coming back saying she is not an active member and the secondary is noted as [employer] medicaid but the card scanned in was a straight medicaid.  Just need to verify both.

## 2025-07-30 NOTE — TELEPHONE ENCOUNTER
Verified patients insurance was [employer] medicaid and looked it up and no PA was needed.  Order faxed to [facility] for them to call patient and schedule.    Please see attached

## 2025-08-07 DIAGNOSIS — M54.42 CHRONIC MIDLINE LOW BACK PAIN WITH BILATERAL SCIATICA: ICD-10-CM

## 2025-08-07 DIAGNOSIS — G89.29 CHRONIC MIDLINE LOW BACK PAIN WITH BILATERAL SCIATICA: ICD-10-CM

## 2025-08-07 DIAGNOSIS — M54.41 CHRONIC MIDLINE LOW BACK PAIN WITH BILATERAL SCIATICA: ICD-10-CM

## 2025-08-08 ENCOUNTER — HOSPITAL ENCOUNTER (OUTPATIENT)
Dept: MRI IMAGING | Age: 55
Discharge: HOME | End: 2025-08-08
Attending: RADIOLOGY

## 2025-08-08 DIAGNOSIS — Z00.6 EXAMINATION FOR NORMAL COMPARISON FOR CLINICAL RESEARCH: ICD-10-CM

## 2025-08-08 LAB
ALBUMIN: 4 G/DL
ALP BLD-CCNC: 88 U/L
ALT SERPL-CCNC: 15 U/L
ANION GAP SERPL CALCULATED.3IONS-SCNC: 11 MMOL/L
AST SERPL-CCNC: 20 U/L
BASOPHILS ABSOLUTE: 0.1 /ΜL
BASOPHILS RELATIVE PERCENT: 0.6 %
BILIRUB SERPL-MCNC: 0.3 MG/DL (ref 0.1–1.4)
BUN BLDV-MCNC: 16 MG/DL
C-REACTIVE PROTEIN: 0.6
CALCIUM SERPL-MCNC: 8.9 MG/DL
CHLORIDE BLD-SCNC: 107 MMOL/L
CO2: 25 MMOL/L
CREAT SERPL-MCNC: 0.9 MG/DL
EOSINOPHILS ABSOLUTE: 0.3 /ΜL
EOSINOPHILS RELATIVE PERCENT: 4.1 %
GFR, ESTIMATED: 60
GLUCOSE BLD-MCNC: 93 MG/DL
HCT VFR BLD CALC: 41.9 % (ref 36–46)
HEMOGLOBIN: 13.7 G/DL (ref 12–16)
LYMPHOCYTES ABSOLUTE: 2.1 /ΜL
LYMPHOCYTES RELATIVE PERCENT: 24.9 %
MCH RBC QN AUTO: 29.6 PG
MCHC RBC AUTO-ENTMCNC: 32.7 G/DL
MCV RBC AUTO: 90.5 FL
MONOCYTES ABSOLUTE: 0.5 /ΜL
MONOCYTES RELATIVE PERCENT: 5.8 %
NEUTROPHILS ABSOLUTE: 5.3 /ΜL
NEUTROPHILS RELATIVE PERCENT: 64.4 %
PDW BLD-RTO: 13.7 %
PLATELET # BLD: 239 K/ΜL
PMV BLD AUTO: 11.1 FL
POTASSIUM SERPL-SCNC: 4.1 MMOL/L
RBC # BLD: 4.63 10^6/ΜL
SED RATE, AUTOMATED: 2
SODIUM BLD-SCNC: 139 MMOL/L
TOTAL PROTEIN: 6.7 G/DL (ref 6.4–8.2)
WBC # BLD: 8.2 10^3/ML

## 2025-08-18 DIAGNOSIS — S76.011D TEAR OF RIGHT GLUTEUS MEDIUS TENDON, SUBSEQUENT ENCOUNTER: Primary | ICD-10-CM

## 2025-08-18 DIAGNOSIS — S76.012D TEAR OF LEFT GLUTEUS MEDIUS TENDON, SUBSEQUENT ENCOUNTER: ICD-10-CM

## 2025-08-18 RX ORDER — PREDNISONE 5 MG/1
TABLET ORAL
Qty: 40 TABLET | Refills: 0 | Status: SHIPPED | OUTPATIENT
Start: 2025-08-18 | End: 2025-09-03